# Patient Record
Sex: FEMALE | Race: ASIAN | NOT HISPANIC OR LATINO | Employment: UNEMPLOYED | URBAN - METROPOLITAN AREA
[De-identification: names, ages, dates, MRNs, and addresses within clinical notes are randomized per-mention and may not be internally consistent; named-entity substitution may affect disease eponyms.]

---

## 2017-09-29 ENCOUNTER — COMMUNICATION - HEALTHEAST (OUTPATIENT)
Dept: FAMILY MEDICINE | Facility: CLINIC | Age: 27
End: 2017-09-29

## 2017-10-05 ENCOUNTER — OFFICE VISIT - HEALTHEAST (OUTPATIENT)
Dept: FAMILY MEDICINE | Facility: CLINIC | Age: 27
End: 2017-10-05

## 2017-10-05 DIAGNOSIS — M25.531 RIGHT WRIST PAIN: ICD-10-CM

## 2017-10-05 DIAGNOSIS — Z23 NEED FOR IMMUNIZATION AGAINST INFLUENZA: ICD-10-CM

## 2017-10-05 ASSESSMENT — MIFFLIN-ST. JEOR: SCORE: 1221.18

## 2018-03-26 ENCOUNTER — COMMUNICATION - HEALTHEAST (OUTPATIENT)
Dept: FAMILY MEDICINE | Facility: CLINIC | Age: 28
End: 2018-03-26

## 2018-04-04 ENCOUNTER — COMMUNICATION - HEALTHEAST (OUTPATIENT)
Dept: FAMILY MEDICINE | Facility: CLINIC | Age: 28
End: 2018-04-04

## 2018-04-12 ENCOUNTER — OFFICE VISIT - HEALTHEAST (OUTPATIENT)
Dept: FAMILY MEDICINE | Facility: CLINIC | Age: 28
End: 2018-04-12

## 2018-04-12 DIAGNOSIS — Z00.00 ROUTINE GENERAL MEDICAL EXAMINATION AT A HEALTH CARE FACILITY: ICD-10-CM

## 2018-04-12 DIAGNOSIS — L91.8 SKIN TAG: ICD-10-CM

## 2018-04-12 LAB
CLUE CELLS: NORMAL
TRICHOMONAS, WET PREP: NORMAL
YEAST, WET PREP: NORMAL

## 2018-04-12 RX ORDER — COPPER 313.4 MG/1
1 INTRAUTERINE DEVICE INTRAUTERINE ONCE
Status: SHIPPED | COMMUNITY
Start: 2018-04-12 | End: 2024-02-02

## 2018-04-12 ASSESSMENT — MIFFLIN-ST. JEOR: SCORE: 1190.56

## 2018-04-13 LAB
C TRACH DNA SPEC QL PROBE+SIG AMP: NEGATIVE
N GONORRHOEA DNA SPEC QL NAA+PROBE: NEGATIVE

## 2018-04-16 LAB

## 2018-10-13 ENCOUNTER — AMBULATORY - HEALTHEAST (OUTPATIENT)
Dept: NURSING | Facility: CLINIC | Age: 28
End: 2018-10-13

## 2018-10-13 DIAGNOSIS — Z23 NEED FOR PROPHYLACTIC VACCINATION AND INOCULATION AGAINST INFLUENZA: ICD-10-CM

## 2019-04-04 ENCOUNTER — OFFICE VISIT - HEALTHEAST (OUTPATIENT)
Dept: FAMILY MEDICINE | Facility: CLINIC | Age: 29
End: 2019-04-04

## 2019-04-04 ENCOUNTER — COMMUNICATION - HEALTHEAST (OUTPATIENT)
Dept: FAMILY MEDICINE | Facility: CLINIC | Age: 29
End: 2019-04-04

## 2019-04-04 DIAGNOSIS — D69.6 THROMBOCYTOPENIA, UNSPECIFIED (H): ICD-10-CM

## 2019-04-04 DIAGNOSIS — G24.3 SPASMODIC TORTICOLLIS: ICD-10-CM

## 2019-04-04 DIAGNOSIS — S39.012A BACK STRAIN, INITIAL ENCOUNTER: ICD-10-CM

## 2019-04-04 DIAGNOSIS — D50.8 OTHER IRON DEFICIENCY ANEMIA: ICD-10-CM

## 2019-04-04 LAB
ERYTHROCYTE [DISTWIDTH] IN BLOOD BY AUTOMATED COUNT: 13 % (ref 11–14.5)
HCT VFR BLD AUTO: 34.5 % (ref 35–47)
HGB BLD-MCNC: 11 G/DL (ref 12–16)
MCH RBC QN AUTO: 24.7 PG (ref 27–34)
MCHC RBC AUTO-ENTMCNC: 32 G/DL (ref 32–36)
MCV RBC AUTO: 77 FL (ref 80–100)
PLATELET # BLD AUTO: 263 THOU/UL (ref 140–440)
PMV BLD AUTO: 8.3 FL (ref 7–10)
RBC # BLD AUTO: 4.47 MILL/UL (ref 3.8–5.4)
WBC: 5.6 THOU/UL (ref 4–11)

## 2019-04-04 ASSESSMENT — MIFFLIN-ST. JEOR: SCORE: 1202.47

## 2019-07-03 ENCOUNTER — OFFICE VISIT - HEALTHEAST (OUTPATIENT)
Dept: FAMILY MEDICINE | Facility: CLINIC | Age: 29
End: 2019-07-03

## 2019-07-03 DIAGNOSIS — D50.8 OTHER IRON DEFICIENCY ANEMIA: ICD-10-CM

## 2019-07-03 DIAGNOSIS — R71.8 MICROCYTOSIS: ICD-10-CM

## 2019-07-03 DIAGNOSIS — G24.3 SPASMODIC TORTICOLLIS: ICD-10-CM

## 2019-07-03 DIAGNOSIS — E66.01 CLASS 2 SEVERE OBESITY DUE TO EXCESS CALORIES WITH SERIOUS COMORBIDITY IN ADULT, UNSPECIFIED BMI (H): ICD-10-CM

## 2019-07-03 DIAGNOSIS — E66.812 CLASS 2 SEVERE OBESITY DUE TO EXCESS CALORIES WITH SERIOUS COMORBIDITY IN ADULT, UNSPECIFIED BMI (H): ICD-10-CM

## 2019-07-03 LAB
ERYTHROCYTE [DISTWIDTH] IN BLOOD BY AUTOMATED COUNT: 12.2 % (ref 11–14.5)
FERRITIN SERPL-MCNC: 7 NG/ML (ref 10–130)
HCT VFR BLD AUTO: 35 % (ref 35–47)
HGB BLD-MCNC: 11.5 G/DL (ref 12–16)
IRON SATN MFR SERPL: 6 % (ref 20–50)
IRON SERPL-MCNC: 24 UG/DL (ref 42–175)
MCH RBC QN AUTO: 25.6 PG (ref 27–34)
MCHC RBC AUTO-ENTMCNC: 32.8 G/DL (ref 32–36)
MCV RBC AUTO: 78 FL (ref 80–100)
PLATELET # BLD AUTO: 241 THOU/UL (ref 140–440)
PMV BLD AUTO: 8.6 FL (ref 7–10)
RBC # BLD AUTO: 4.48 MILL/UL (ref 3.8–5.4)
TIBC SERPL-MCNC: 402 UG/DL (ref 313–563)
TRANSFERRIN SERPL-MCNC: 322 MG/DL (ref 212–360)
WBC: 7.2 THOU/UL (ref 4–11)

## 2019-07-03 ASSESSMENT — MIFFLIN-ST. JEOR: SCORE: 1195.1

## 2019-07-05 ENCOUNTER — COMMUNICATION - HEALTHEAST (OUTPATIENT)
Dept: FAMILY MEDICINE | Facility: CLINIC | Age: 29
End: 2019-07-05

## 2019-07-09 ENCOUNTER — COMMUNICATION - HEALTHEAST (OUTPATIENT)
Dept: FAMILY MEDICINE | Facility: CLINIC | Age: 29
End: 2019-07-09

## 2021-05-27 NOTE — TELEPHONE ENCOUNTER
Int. ID: [id number]  Int. Name: Saw    Spoke to patient and relayed message. She understands the recommendation. No further questions. Schedule her for a 3 month follow up on Anemia.

## 2021-05-27 NOTE — TELEPHONE ENCOUNTER
----- Message from Papo Naik MD sent at 4/4/2019  3:44 PM CDT -----  Please call patient.  Tell patient:  Blood is about the same.  Likely from heavy menses.  Continue to maintain meat intake for the iron to supplement losses.  Check 3 months
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
## 2021-05-30 NOTE — TELEPHONE ENCOUNTER
Use  line to contact :Jesús ID:[id number]  Called and spoke with patient inform them of message. Per Patient she was told at the visit 7/3/2019 that you were going to send her some other back pain medication.

## 2021-05-30 NOTE — TELEPHONE ENCOUNTER
Received the same message as below. Will try again.     Use  line to contact : Negrita Herrera  ID:[id number]

## 2021-05-30 NOTE — TELEPHONE ENCOUNTER
Dr. Naik, this pt saw you on 7/3/19. Is the pt suppose to be getting prescription for below? If yes, please send Rx to [facility] pharmacy. Thanks.

## 2021-05-30 NOTE — TELEPHONE ENCOUNTER
Pt's spouse called questioning about the pt's medication. Pt have not returned call after 3 attempts. Completing task.

## 2021-05-30 NOTE — TELEPHONE ENCOUNTER
Attempted #1 Voice mail is not set up unable to leave message. Will try again later.   Use  line to contact : Aakash Paw  ID:[id number]

## 2021-05-30 NOTE — PROGRESS NOTES
Eating more meat  Hx small cell anemia assoc menses.    Energy good.    Past nl Hb    Obesity denies polyuria  Torticollis same     working  OBJECTIVE:   Vitals:    07/03/19 1614   BP: 102/50   Pulse: 100   Resp: 16      Eyes: non icteric, noninflamed  Lungs: no resp distress  Heart: regular  Ankles: no edema  Muscles: nontender  Mental status: euthymic  Neuro: torticollis  Left lashes without pigment    Body mass index is 31.96 kg/m .     ASSESSMENT/PLAN:  Mild microcytic anemia. ? thal or iron def or both.  Now on increased meat intake / labs and follow up per result  Chronic issues stable/ same treatment.   Obesity stable.  Has been asked to increase meat intake.  Hold on above for diet discussion    Additional diagnoses and related orders:  1. Spasmodic torticollis     2. Microcytosis  HM2(CBC w/o Differential)    Iron and Transferrin Iron Binding Capacity    Ferritin   3. Other iron deficiency anemia  Iron and Transferrin Iron Binding Capacity    Ferritin   4. Class 2 severe obesity due to excess calories with serious comorbidity in adult, unspecified BMI (H)

## 2021-05-30 NOTE — TELEPHONE ENCOUNTER
Question following Office Visit  When did you see your provider: 7/3/19  What is your question:  is calling via  for his wife stating she was told this medication would refilled :  methocarbamol (ROBAXIN) 750 MG tablet 40 tablet 0 4/4/2019 4/14/2019 --   Sig - Route: Take 1 tablet (750 mg total) by mouth 4 (four) times a day for 10 days. - Oral   Sent to pharmacy as: methocarbamol (ROBAXIN) 750 MG tablet   E-Prescribing Status: Receipt confirmed by pharmacy (4/4/2019  2:56 PM CDT)     Please send to [facility]  Okay to leave a detailed message: Yes    Patients  is not on the consent to communicate with. Advised  that patient needs to be present with return call to give verbal consent to speak with .
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
## 2021-05-30 NOTE — TELEPHONE ENCOUNTER
Used the language line, 's ID # is [id number]. Left message for the patient to call back. Ok to relay message upon returned call.

## 2021-05-30 NOTE — TELEPHONE ENCOUNTER
"Called and left message for pt to return call.# 1  Use  line to contact :[facility] ID:[id number]  \" Okay to relay message\"    "

## 2021-05-31 VITALS — HEIGHT: 56 IN | WEIGHT: 144 LBS | BODY MASS INDEX: 32.39 KG/M2

## 2021-06-01 VITALS — HEIGHT: 56 IN | BODY MASS INDEX: 31.27 KG/M2 | WEIGHT: 139 LBS

## 2021-06-02 VITALS — WEIGHT: 139 LBS | BODY MASS INDEX: 31.27 KG/M2 | HEIGHT: 56 IN

## 2021-06-03 VITALS — BODY MASS INDEX: 31.49 KG/M2 | HEIGHT: 56 IN | WEIGHT: 140 LBS

## 2021-06-09 ENCOUNTER — AMBULATORY - HEALTHEAST (OUTPATIENT)
Dept: NURSING | Facility: CLINIC | Age: 31
End: 2021-06-09

## 2021-06-13 NOTE — PROGRESS NOTES
Subjective:      Fady Blood is a 26 y.o. female who presents for evaluation of right wrist pain, status post wrist surgery 1 year ago.  She had ORIF of right wrist distal radius fracture about 1 year ago.  She kept all of her normal postop visits with the surgeon.  I reviewed last visit note from 12/1/2016, and things seemed to be proceeding as expected.  She started having pain in the right wrist about 1 or 2 weeks ago.  She describes the pain as feeling achy or pressure.  She has not tried any medicines for the pain yet.  She feels like cold weather makes the pain worse.  Pain usually stays at the wrist, but sometimes pain extends up towards the elbow.  No numbness or tingling.  No activity changes recently.  Normal  strength.  Patient is right-hand dominant.    Patient Active Problem List   Diagnosis     Spasmodic torticollis     Thrombocytopenia     Gynecologic Services Intrauterine Device (IUD) Checking     Vitamin D Deficiency     Closed fracture of distal end of right radius       Current Outpatient Prescriptions:      ibuprofen (ADVIL,MOTRIN) 400 MG tablet, Take 1 tablet (400 mg total) by mouth every 8 (eight) hours as needed for pain. Take with food., Disp: 40 tablet, Rfl: 0     Objective:     Allergies:  Review of patient's allergies indicates no known allergies.    Vitals:  Vitals:    10/05/17 1328   BP: 98/60   Pulse: 76   Resp: 16   Temp: 97.8  F (36.6  C)     Body mass index is 32.28 kg/(m^2).    Vital signs reviewed.  General: Patient is alert and oriented x 3, in no apparent distress  Cardiac: regular rate and rhythm, no murmurs  Pulmonary: lungs clear to auscultation bilaterally, no crackles, rales, rhonchi, or wheezing noted  Musculoskeletal: Mild pain with direct palpation over right distal radius where surgical scar is present, scar itself appears well-healed, no signs of infection or erythema, normal radial pulse, normal  strength, no pain at all with full active range of motion of  the right wrist    Assessment and Plan:   1.  Intermittent Right wrist pain, status post ORIF right distal radius fracture.  Surgery was completed about 1 year ago.  Exam is grossly normal today.  Prescription sent for ibuprofen to be used as needed.  I would like her to try this first for a few weeks.  If this is not helpful, she will let us know, then follow-up with Ortho.    This dictation uses voice recognition software, which may contain typographical errors.

## 2021-06-16 PROBLEM — R71.8 MICROCYTOSIS: Status: ACTIVE | Noted: 2019-07-03

## 2021-06-16 PROBLEM — D64.9 ANEMIA: Status: ACTIVE | Noted: 2019-07-03

## 2021-06-17 NOTE — PROGRESS NOTES
Assessment:   1. Routine general medical examination at a health care facility  - Gynecologic Cytology (PAP Smear)  - Chlamydia trachomatis & Neisseria gonorrhoeae, Amplified Detection  - Wet Prep, Vaginal  - lidocaine 10 mg/mL (1 %) injection 3 mL; 3 mL by Other route once.  - lidocaine-EPINEPHrine (PF) 2 %-1:200,000 injection 3 mL (XYLOCAINE W/EPI); 3 mL by Other route once.  - lidocaine-EPINEPHrine 2 %-1:100,000 injection 3 mL (XYLOCAINE W/EPI); 3 mL by Other route once  2. Skin tag  Plan:      All questions answered.  Await pap smear results.  STD screening negative.   Discussed results of wet prep. Will monitor her symptoms for any change or worsening and follow up.   Breast self exam technique reviewed and patient encouraged to perform self-exam monthly.  Discussed healthy lifestyle modifications.   Discussed options of management of skin tags. Patient would like them removed today. See procedure details below. No complications. Skin care discussed. Follow up as needed for this.   Subjective:      Fady Blood is a 27 y.o. female who presents for an annual exam. The patient is sexually active. The patient participates in regular exercise: no. The patient reports that there is not domestic violence in her life.   Is well today. Would like to have her IUD strings checked. She agrees to STD screening. She denies abnormal vaginal bleeding. She has some discharge some times, intermittent, over the last year. She wants to check for other infections.   She has skin tags on her left neck. She would like them to be removed. There are some bigger ones, some are smaller. She only wants the bigger skin tags removed because they are irritated by clothing, her necklace.     Healthy Habits:   Regular Exercise: No  Sunscreen Use: No  Healthy Diet: Yes  Dental Visits Regularly: Yes  Seat Belt: Yes  Sexually active: Yes  Self Breast Exam Monthly:Yes  Hemoccults: N/A  Flex Sig: N/A  Colonoscopy: N/A  Lipid Profile: No  Glucose  Screen: No  Prevention of Osteoporosis: No  Last Dexa: No  Guns at Home:  No      Immunization History   Administered Date(s) Administered     HPV Quadrivalent 2012, 2013     Hep A, historic 1900     Hep B, historic 12/15/2010, 2011, 2012     Influenza, Seasonal, Inj PF IIV3 2013     Influenza, seasonal,quad inj 36+ mos 2015, 10/05/2017     Influenza, seasonal,quad inj 6-35 mos 2011, 2012, 10/21/2014     MMR 12/15/2010     Td,adult,historic,unspecified 12/15/2010, 2012     Tdap 2011, 2013     Varicella Zoster Immune Globulin 2013     Immunization status: up to date and documented.    No exam data present    Gynecologic History  Patient's last menstrual period was 2018 (lmp unknown).  Contraception: IUD  Last Pap: . Results were: normal  Last mammogram: n/a      OB History    Para Term  AB Living   3 3 3   6   SAB TAB Ectopic Multiple Live Births       3      # Outcome Date GA Lbr Carlton/2nd Weight Sex Delivery Anes PTL Lv   3 Term     M Vag-Spont   SHANTHI   2 Term     F Vag-Spont   SHANTHI   1 Term     M Vag-Spont   SHANTHI      Obstetric Comments   1st child born in [facility], 2nd/3rd born in [place]       Current Outpatient Prescriptions   Medication Sig Dispense Refill     copper (PARAGARD) 380 square mm IUD IUD 1 each by Intrauterine route once. Inserted: 1/3/2014, EXP:2024       Current Facility-Administered Medications   Medication Dose Route Frequency Provider Last Rate Last Dose     lidocaine 10 mg/mL (1 %) injection 3 mL  3 mL Other Once Linda Fu MD         lidocaine-EPINEPHrine (PF) 2 %-1:200,000 injection 3 mL (XYLOCAINE W/EPI)  3 mL Other Once Linda Fu MD         No past medical history on file.  Past Surgical History:   Procedure Laterality Date     NO PAST SURGERIES       Review of patient's allergies indicates no known allergies.  Family History   Problem Relation Age of Onset     No Medical  "Problems Mother      No Medical Problems Father      Social History     Social History     Marital status:      Spouse name: N/A     Number of children: N/A     Years of education: N/A     Occupational History     not currently employed      Social History Main Topics     Smoking status: Never Smoker     Smokeless tobacco: Never Used     Alcohol use No     Drug use: No     Sexual activity: Yes     Partners: Male     Birth control/ protection: IUD     Other Topics Concern     Not on file     Social History Narrative    Patient is Suad speaking.    Lives with , 3 children    Born in [place], was in [place] ([place] to  [place]       Review of Systems  General:  Denies problem  Eyes: Denies problem  Ears/Nose/Throat: Denies problem  Cardiovascular: Denies problem  Respiratory:  Denies problem  Gastrointestinal:  Denies problem  Genitourinary: Denies problem  Musculoskeletal:  Denies problem  Skin: Denies problem  Neurologic: Denies problem  Psychiatric: Denies problem  Endocrine: Denies problem  Heme/Lymphatic: Denies problem   Allergic/Immunologic: Denies problem        Objective:         Vitals:    04/12/18 0800   BP: 94/70   Pulse: 78   Resp: 20   Temp: 98.2  F (36.8  C)   TempSrc: Oral   Weight: 139 lb (63 kg)   Height: 4' 7.5\" (1.41 m)     Body mass index is 31.73 kg/(m^2).    Physical Exam:  General Appearance: Alert, cooperative, no distress, appears stated age  Head: Normocephalic, without obvious abnormality, atraumatic  Eyes: PERRL, conjunctiva/corneas clear, EOM's intact  Ears: Normal TM's and external ear canals, both ears  Nose: Nares normal, septum midline,mucosa normal, no drainage  Throat: Lips, mucosa, and tongue normal; teeth and gums normal  Neck: Supple, symmetrical, trachea midline, no adenopathy;  thyroid: not enlarged, symmetric, no tenderness/mass/nodules; no carotid bruit or JVD  Back: Symmetric, no curvature, ROM normal, no CVA tenderness  Lungs: Clear to " auscultation bilaterally, respirations unlabored  Breasts: No breast masses, tenderness, asymmetry, or nipple discharge.  Heart: Regular rate and rhythm, S1 and S2 normal, no murmur, rub, or gallop  Abdomen: Soft, non-tender, bowel sounds active all four quadrants,  no masses, no organomegaly  Pelvic:Normally developed genitalia with no external lesions or eruptions. Vagina and cervix show no lesions, inflammation, discharge or tenderness. No cystocele, No rectocele. Uterus normal.  No adnexal mass or tenderness. IUD in place.   Extremities: Extremities normal, atraumatic, no cyanosis or edema  Skin: left neck with five pinpoint skin tags, 2 raised skin tags, 1-2 mm left neck, 1 raised skin tag left axilla  Lymph nodes: Cervical, supraclavicular, and axillary nodes normal  Neurologic: Normal      Recent Results (from the past 240 hour(s))   Gynecologic Cytology (PAP Smear)   Result Value Ref Range    Case Report       Gynecologic Cytology Report                       Case: [id number]                                   Authorizing Provider:  Linda Fu MD           Collected:           04/12/2018 0842              Ordering Location:     [facility]  Received:            04/12/2018 0843                                     Medicine/OB                                                                  First Screen:          VALE Robbins                                                                            (ASCP)                                                                       Rescreen:              VALE Drake                                                                             (ASCP)                                                                       Specimen:    SUREPATH PAP, SCREENING, Endocervical/cervical                                             Interpretation       Negative for squamous intraepithelial lesion or malignancy    Result Flag Normal Normal     Other Findings       Bacteria morphologically consistent with Actinomyces spp    Specimen Adequacy       Satisfactory for evaluation, endocervical/transformation zone component present    HPV Reflex? Yes if Abnormal     HIGH RISK No     LMP/Menopause Date 3/31/18     Abnormal Bleeding No     Pt Status n/a     Birth Control/Hormones IUD-No hormone     Previous Normal/Date 2013     Prev Abn Date/Dx none     Cervical Appearance normal    Chlamydia trachomatis & Neisseria gonorrhoeae, Amplified Detection   Result Value Ref Range    Chlamydia trachomatis, Amplified Detection Negative Negative    Neisseria gonorrhoeae, Amplified Detection Negative Negative   Wet Prep, Vaginal   Result Value Ref Range    Yeast Result No yeast seen No yeast seen    Trichomonas No Trichomonas seen No Trichomonas seen    Clue Cells, Wet Prep No Clue cells seen No Clue cells seen     Procedure:     Diagnosis: Chronically irritated skin tag   Instructions:    1. The patient is instructed to watch for signs of infection including erythema, pain,       purulent discharge, or crusting.   2. Verbal patient instruction given.  3. Follow up as needed.    The patient wishes skin tag removed as the lesion is getting caught on clothing and/or jewelry and is recurrently irritated.      Skin:  3 skin tags in the neck, left region, left axilla, were removed using scissors and forceps after betadine, alcohol prep; hemostasis is obtained with pressure and discarded.      No complications.   EBL: minimal

## 2021-06-19 NOTE — LETTER
Letter by Papo Naik MD at      Author: Papo Naik MD Service: -- Author Type: --    Filed:  Encounter Date: 7/3/2019 Status: (Other)         July 3, 2019     Patient: Fady Blood   YOB: 1990   Date of Visit: 7/3/2019       To Whom It May Concern:    It is my medical opinion that Fady Blood may return to work on July 8.    If you have any questions or concerns, please don't hesitate to call.    Sincerely,        Electronically signed by Papo Naik MD

## 2021-06-19 NOTE — LETTER
Letter by Papo Naik MD at      Author: Papo Naik MD Service: -- Author Type: --    Filed:  Encounter Date: 7/5/2019 Status: (Other)         Fady Duffy Jeremi  [address]  [place] [address]             July 5, 2019        Dear Ms. Blood,    Below are the results from your recent visit:    Resulted Orders   HM2(CBC w/o Differential)   Result Value Ref Range    WBC 7.2 4.0 - 11.0 thou/uL    RBC 4.48 3.80 - 5.40 mill/uL    Hemoglobin 11.5 (L) 12.0 - 16.0 g/dL    Hematocrit 35.0 35.0 - 47.0 %    MCV 78 (L) 80 - 100 fL    MCH 25.6 (L) 27.0 - 34.0 pg    MCHC 32.8 32.0 - 36.0 g/dL    RDW 12.2 11.0 - 14.5 %    Platelets 241 140 - 440 thou/uL    MPV 8.6 7.0 - 10.0 fL   Iron and Transferrin Iron Binding Capacity   Result Value Ref Range    Iron 24 (L) 42 - 175 ug/dL    Transferrin 322 212 - 360 mg/dL    Transferrin Saturation, Calculated 6 (L) 20 - 50 %    Transferrin IBC, Calculated 402 313 - 563 ug/dL   Ferritin   Result Value Ref Range    Ferritin 7 (L) 10 - 130 ng/mL       The blood is a littler better but the iron is still low.  Try to eat more meat and check in four months.      Please call with questions or contact us using [employer].    Sincerely,        Electronically signed by Papo Naik MD

## 2021-06-30 ENCOUNTER — AMBULATORY - HEALTHEAST (OUTPATIENT)
Dept: NURSING | Facility: CLINIC | Age: 31
End: 2021-06-30

## 2022-08-29 ENCOUNTER — HOSPITAL ENCOUNTER (EMERGENCY)
Facility: HOSPITAL | Age: 32
Discharge: HOME | End: 2022-08-29
Attending: EMERGENCY MEDICINE | Admitting: EMERGENCY MEDICINE
Payer: COMMERCIAL

## 2022-08-29 ENCOUNTER — APPOINTMENT (OUTPATIENT)
Dept: RADIOLOGY | Facility: HOSPITAL | Age: 32
End: 2022-08-29
Attending: EMERGENCY MEDICINE
Payer: COMMERCIAL

## 2022-08-29 VITALS
TEMPERATURE: 98.6 F | SYSTOLIC BLOOD PRESSURE: 145 MMHG | HEART RATE: 84 BPM | DIASTOLIC BLOOD PRESSURE: 88 MMHG | OXYGEN SATURATION: 98 % | RESPIRATION RATE: 20 BRPM

## 2022-08-29 DIAGNOSIS — S83.91XA SPRAIN OF RIGHT KNEE, UNSPECIFIED LIGAMENT, INITIAL ENCOUNTER: ICD-10-CM

## 2022-08-29 PROCEDURE — 99283 EMERGENCY DEPT VISIT LOW MDM: CPT

## 2022-08-29 PROCEDURE — 73560 X-RAY EXAM OF KNEE 1 OR 2: CPT | Mod: RT

## 2022-08-29 RX ORDER — IBUPROFEN 600 MG/1
600 TABLET, FILM COATED ORAL EVERY 8 HOURS PRN
Qty: 30 TABLET | Refills: 0 | Status: SHIPPED | OUTPATIENT
Start: 2022-08-29 | End: 2023-03-24

## 2022-08-29 NOTE — ED NOTES
ED Provider In Triage Note  [facility]  Encounter Date: Aug 29, 2022    Chief Complaint   Patient presents with     Knee Pain       Brief HPI:   Fady Blood is a 31 year old female presenting to the Emergency Department with a chief complaint of twisted right knee on Friday getting out of car. Delayed swelling. Medial knee pain.     Brief Physical Exam:  BP (!) 145/88   Pulse 84   Temp 98.6  F (37  C) (Temporal)   Resp 20   SpO2 98%   General: Non-toxic appearing  HEENT: Atraumatic  Resp: No respiratory distress  Abdomen: Non-peritoneal  Neuro: Alert, oriented, answers questions appropriately  Right knee: crepitus. Full extension. No visible deformity. No tibial plateau tenderness. limp  Psych: Behavior appropriate      Plan Initiated in Triage:  Orders Placed This Encounter     XR Knee Right 1/2 Views       PIT Dispo:   Return to lobby while awaiting workup and ED bed availability    Dipesh Scott MD on 8/29/2022 at 11:09 AM    Patient was evaluated by the Physician in Triage due to a limitation of available rooms in the Emergency Department. A plan of care was discussed based on the information obtained on the initial evaluation and patient was consuled to return back to the Emergency Department lobby after this initial evalutaiton until results were obtained or a room became available in the Emergency Department. Patient was counseled not to leave prior to receiving the results of their workup.     Dipesh Scott MD  [facility] EMERGENCY DEPARTMENT  [address] [place] [id number]  [phone]     Dipesh Scott MD  08/29/22 2887

## 2022-08-29 NOTE — ED PROVIDER NOTES
EMERGENCY DEPARTMENT ENCOUNTER      NAME: Fady Blood  AGE: 31 year old female  YOB: 1990  MRN: [id number]  EVALUATION DATE & TIME: No admission date for patient encounter.    PCP: Linda Fu    ED PROVIDER: Eyal Ching M.D.      Chief Complaint   Patient presents with     Knee Pain         FINAL IMPRESSION:  Right knee sprain      ED COURSE & MEDICAL DECISION MAKING:    Pertinent Labs & Imaging studies reviewed. (See chart for details)  31 year old female presents to the Emergency Department for evaluation of right knee pain.  Patient reports an achiness since twisting it while leaving work on Friday.  Patient has been ambulatory but continues to cause problems.  X-rays obtained in triage are unremarkable.  No fractures no effusions.  Examination reveals good range of motion.  No warmth or crepitus.  Gait normal.. Patient appears non toxic with stable vitals signs. Overall exam is benign.        12:02 PM I met with the patient for the initial interview and physical examination. Discussed plan for treatment and workup in the ED.      At the conclusion of the encounter I discussed the results of all of the tests and the disposition. The questions were answered and return precautions provided. The patient or family acknowledged understanding and was agreeable with the care plan.       PPE: Provider wore gloves, eye protection, surgical cap, and paper mask.     MEDICATIONS GIVEN IN THE EMERGENCY:  Medications - No data to display    NEW PRESCRIPTIONS STARTED AT TODAY'S ER VISIT  Current Discharge Medication List      START taking these medications    Details   ibuprofen (ADVIL/MOTRIN) 600 MG tablet Take 1 tablet (600 mg) by mouth every 8 hours as needed for moderate pain  Qty: 30 tablet, Refills: 0                =================================================================    HPI    Patient information was obtained from: Patient     Use of Intrepreter: Yes (by daughter in person) Language: Suad          Fady Blood is a 31 year old female with no pertient medical history who presents to the ED for evaluation of knee pain.    Patient reports on Friday (3 days ago), she was leaving work. She stepped outside and felt like she twisted her right knee. She reports her knee later became swollen at home. Patient denies any other current complaints.    REVIEW OF SYSTEMS   Constitutional:  Denies fever, chills  Respiratory:  Denies productive cough or increased work of breathing  Cardiovascular:  Denies chest pain, palpitations  GI:  Denies abdominal pain, nausea, vomiting, or change in bowel or bladder habits   Musculoskeletal:  Positive for right knee pain and swelling.  Skin:  Denies rash   Neurologic:  Denies focal weakness  All systems negative except as marked.     PAST MEDICAL HISTORY:  No past medical history on file.    PAST SURGICAL HISTORY:  Past Surgical History:   Procedure Laterality Date     NO PAST SURGERIES           CURRENT MEDICATIONS:    No current facility-administered medications for this encounter.    Current Outpatient Medications:      copper (PARAGARD) 380 square mm IUD IUD, [COPPER (PARAGARD) 380 SQUARE MM IUD IUD] 1 each by Intrauterine route once. Inserted: 1/3/2014, EXP:01/2024, Disp: , Rfl:     ALLERGIES:  No Known Allergies    FAMILY HISTORY:  Family History   Problem Relation Age of Onset     No Known Problems Mother      No Known Problems Father        SOCIAL HISTORY:   Social History     Socioeconomic History     Marital status:    Tobacco Use     Smoking status: Never Smoker     Smokeless tobacco: Never Used   Substance and Sexual Activity     Alcohol use: No     Drug use: No     Sexual activity: Yes     Partners: Male     Birth control/protection: I.U.D.   Social History Narrative    Patient is Suad speaking.  Lives with , 3 children  Born in [facility], was in refugee camps x 10 years ([facility]); to  2011       VITALS:  Patient Vitals for the past 24 hrs:   BP Temp  Temp src Pulse Resp SpO2   08/29/22 1104 (!) 145/88 98.6  F (37  C) Temporal 84 20 98 %        PHYSICAL EXAM    Constitutional:  Awake, alert, in no apparent distress  HENT:  Normocephalic, Atraumatic. Bilateral external ears normal. Oropharynx moist. Nose normal. Neck- Normal range of motion   Eyes:  PERRL, EOMI with no signs of entrapment, Conjunctiva normal, No discharge.   Musculoskeletal:  I No edema. Good range of motion in all major joints. Minimal tenderness along medial joint line. No warmth or effusions. No laxity.  Integument:  Warm, Dry, No erythema, No rash.   Neurologic:  Alert & oriented, Normal motor function, Normal sensory function, No focal deficits noted.   Psychiatric:  Affect normal, Judgment normal, Mood normal.         LAB:  All pertinent labs reviewed and interpreted.      RADIOLOGY:  Reviewed all pertinent imaging. Please see official radiology report.  XR Knee Right 1/2 Views   Final Result   IMPRESSION: Normal joint spaces and alignment. No fracture or joint effusion.                                       I, Harmony Liu, am serving as a scribe to document services personally performed by Eyal Ching MD, based on my observation and the provider's statements to me. I, Eyal hCing MD attest that Harmony Liu is acting in a scribe capacity, has observed my performance of the services and has documented them in accordance with my direction.    Eyal Ching M.D.  Emergency Medicine  [facility] EMERGENCY DEPARTMENT     Eyal Ching MD  08/29/22 [id number]

## 2022-08-29 NOTE — Clinical Note
Fady Blood was seen and treated in our emergency department on 8/29/2022.  She may return to work on 08/30/2022.       If you have any questions or concerns, please don't hesitate to call.      Eyal Ching MD

## 2023-03-24 ENCOUNTER — OFFICE VISIT (OUTPATIENT)
Dept: FAMILY MEDICINE | Facility: CLINIC | Age: 33
End: 2023-03-24
Payer: COMMERCIAL

## 2023-03-24 VITALS
BODY MASS INDEX: 33.29 KG/M2 | DIASTOLIC BLOOD PRESSURE: 68 MMHG | TEMPERATURE: 97.7 F | SYSTOLIC BLOOD PRESSURE: 97 MMHG | HEART RATE: 87 BPM | WEIGHT: 148 LBS | HEIGHT: 56 IN | RESPIRATION RATE: 18 BRPM | OXYGEN SATURATION: 98 %

## 2023-03-24 DIAGNOSIS — Z23 NEEDS FLU SHOT: ICD-10-CM

## 2023-03-24 DIAGNOSIS — Z23 NEED FOR COVID-19 VACCINE: ICD-10-CM

## 2023-03-24 DIAGNOSIS — R05.1 ACUTE COUGH: ICD-10-CM

## 2023-03-24 DIAGNOSIS — N89.8 VAGINAL DISCHARGE: ICD-10-CM

## 2023-03-24 DIAGNOSIS — N64.4 MASTALGIA: Primary | ICD-10-CM

## 2023-03-24 DIAGNOSIS — Z12.4 CERVICAL CANCER SCREENING: ICD-10-CM

## 2023-03-24 LAB
CLUE CELLS: NORMAL
FLUAV AG SPEC QL IA: NEGATIVE
FLUBV AG SPEC QL IA: NEGATIVE
TRICHOMONAS, WET PREP: NORMAL
WBC'S/HIGH POWER FIELD, WET PREP: NORMAL
YEAST, WET PREP: NORMAL

## 2023-03-24 PROCEDURE — 90471 IMMUNIZATION ADMIN: CPT | Performed by: STUDENT IN AN ORGANIZED HEALTH CARE EDUCATION/TRAINING PROGRAM

## 2023-03-24 PROCEDURE — 99203 OFFICE O/P NEW LOW 30 MIN: CPT | Mod: CS | Performed by: STUDENT IN AN ORGANIZED HEALTH CARE EDUCATION/TRAINING PROGRAM

## 2023-03-24 PROCEDURE — U0003 INFECTIOUS AGENT DETECTION BY NUCLEIC ACID (DNA OR RNA); SEVERE ACUTE RESPIRATORY SYNDROME CORONAVIRUS 2 (SARS-COV-2) (CORONAVIRUS DISEASE [COVID-19]), AMPLIFIED PROBE TECHNIQUE, MAKING USE OF HIGH THROUGHPUT TECHNOLOGIES AS DESCRIBED BY CMS-2020-01-R: HCPCS | Performed by: STUDENT IN AN ORGANIZED HEALTH CARE EDUCATION/TRAINING PROGRAM

## 2023-03-24 PROCEDURE — 90686 IIV4 VACC NO PRSV 0.5 ML IM: CPT | Performed by: STUDENT IN AN ORGANIZED HEALTH CARE EDUCATION/TRAINING PROGRAM

## 2023-03-24 PROCEDURE — 87491 CHLMYD TRACH DNA AMP PROBE: CPT | Performed by: STUDENT IN AN ORGANIZED HEALTH CARE EDUCATION/TRAINING PROGRAM

## 2023-03-24 PROCEDURE — 87591 N.GONORRHOEAE DNA AMP PROB: CPT | Performed by: STUDENT IN AN ORGANIZED HEALTH CARE EDUCATION/TRAINING PROGRAM

## 2023-03-24 PROCEDURE — 91312 COVID-19 VACCINE BIVALENT BOOSTER 12+ (PFIZER): CPT | Performed by: STUDENT IN AN ORGANIZED HEALTH CARE EDUCATION/TRAINING PROGRAM

## 2023-03-24 PROCEDURE — 87210 SMEAR WET MOUNT SALINE/INK: CPT | Performed by: STUDENT IN AN ORGANIZED HEALTH CARE EDUCATION/TRAINING PROGRAM

## 2023-03-24 PROCEDURE — 0124A COVID-19 VACCINE BIVALENT BOOSTER 12+ (PFIZER): CPT | Performed by: STUDENT IN AN ORGANIZED HEALTH CARE EDUCATION/TRAINING PROGRAM

## 2023-03-24 PROCEDURE — 87804 INFLUENZA ASSAY W/OPTIC: CPT | Performed by: STUDENT IN AN ORGANIZED HEALTH CARE EDUCATION/TRAINING PROGRAM

## 2023-03-24 PROCEDURE — U0005 INFEC AGEN DETEC AMPLI PROBE: HCPCS | Performed by: STUDENT IN AN ORGANIZED HEALTH CARE EDUCATION/TRAINING PROGRAM

## 2023-03-24 RX ORDER — IBUPROFEN 600 MG/1
600 TABLET, FILM COATED ORAL EVERY 8 HOURS PRN
Qty: 30 TABLET | Refills: 1 | Status: SHIPPED | OUTPATIENT
Start: 2023-03-24 | End: 2023-04-21

## 2023-03-24 NOTE — LETTER
March 31, 2023      Fady CRAVEN Jeremi  [address]   [place] [address]        Dear ,    We are writing to inform you of your test results.    Your test results are negative   Resulted Orders   Symptomatic COVID-19 Virus (Coronavirus) by PCR Nose   Result Value Ref Range    SARS CoV2 PCR Negative Negative      Comment:      NEGATIVE: SARS-CoV-2 (COVID-19) RNA not detected, presumed negative.    Narrative    Testing was performed using the [employer] SARS-CoV-2 Assay on the  [employer] Instrument System. Additional information about this  Emergency Use Authorization (EUA) assay can be found via the Lab  Guide. This test should be ordered for the detection of SARS-CoV-2 in  individuals who meet SARS-CoV-2 clinical and/or epidemiological  criteria. Test performance is unknown in asymptomatic patients. This  test is for in vitro diagnostic use under the FDA EUA for  laboratories certified under CLIA to perform high complexity testing.  This test has not been FDA cleared or approved. A negative result  does not rule out the presence of PCR inhibitors in the specimen or  target RNA in concentration below the limit of detection for the  assay. The possibility of a false negative should be considered if  the patient's recent exposure or clinical presentation suggests  COVID-19. This test was validated by the [facility]. This laboratory is certified under  the Clinical Laboratory Improvement Amendments of 1988 (CLIA-88) as  qualified to perform high complexity laboratory testing.   Influenza A/B antigen   Result Value Ref Range    Influenza A antigen Negative Negative    Influenza B antigen Negative Negative    Narrative    Test results must be correlated with clinical data. If necessary, results should be confirmed by a molecular assay or viral culture.   Wet prep - Clinic Collect   Result Value Ref Range    Trichomonas Absent Absent    Yeast Absent Absent    Clue  Cells Absent Absent    WBCs/high power field None None   Chlamydia & Gonorrhea by PCR, [facility] - Clinic Collect   Result Value Ref Range    Chlamydia Trachomatis Negative Negative      Comment:      Negative for C. trachomatis rRNA by transcription mediated amplification.   A negative result by transcription mediated amplification does not preclude the presence of infection because results are dependent on proper and adequate collection, absence of inhibitors and sufficient rRNA to be detected.    Neisseria gonorrhoeae Negative Negative      Comment:      Negative for N. gonorrhoeae rRNA by transcription mediated amplification. A negative result by transcription mediated amplification does not preclude the presence of C. trachomatis infection because results are dependent on proper and adequate collection, absence of inhibitors and sufficient rRNA to be detected.       If you have any questions or concerns, please call the clinic at the number listed above.       Sincerely,      Jennie Monroy MD

## 2023-03-24 NOTE — PROGRESS NOTES
"  Assessment & Plan     Mastalgia  Reports that this has been chronic for years.  Pain seems to come on 3 weeks out of the month, however was not able to clarify the exact timeline, patient did report that she did not think it was cyclical or related to her period.  No changes in appearance, no lymphadenopathy, no discharge no palpable masses.  Will order mammogram, ibuprofen as needed for pain.  - MA Diagnostic Digital Bilateral  - ibuprofen (ADVIL/MOTRIN) 600 MG tablet  Dispense: 30 tablet; Refill: 1    Acute cough  Likely viral URI.  Lungs are clear to auscultation, vital stable, no respiratory distress.  Will check for COVID and flu.  - Symptomatic COVID-19 Virus (Coronavirus) by PCR Nose  - Influenza A/B antigen    Vaginal discharge  Patient reports chunky white discharge with bloody color.  Declined Pap as she is on her period today.  Patient reported that she would like to self swab.  - Wet prep - Clinic Collect  - Chlamydia & Gonorrhea by PCR, [facility] - Clinic Collect    Needs flu shot  - INFLUENZA VACCINE IM > 6 MONTHS VALENT IIV4 (AFLURIA/FLUZONE)    Need for COVID-19 vaccine  - COVID-19,PF,PFIZER BOOSTER BIVALENT (12+YRS)    Cervical cancer screening  Patient would like to defer secondary to be on her period.       Review of external notes as documented elsewhere in note  }     BMI:   Estimated body mass index is 33.06 kg/m  as calculated from the following:    Height as of this encounter: 1.425 m (4' 8.1\").    Weight as of this encounter: 67.1 kg (148 lb).       Jennie Monroy MD  [facility]   Ma is a 32 year old, presenting for the following health issues:  office visit (Pain in breasts for over 10 years, somehow the pain it getting worse. Vaginal discharge for over a month. Coughing, abd pain.)    Additional Questions 3/24/2023   Roomed by    Accompanied by self     History of Present Illness       Reason for visit:  Pain in breasts    She eats 2-3 servings of " "fruits and vegetables daily.She consumes 0 sweetened beverage(s) daily.She exercises with enough effort to increase her heart rate 9 or less minutes per day.  She exercises with enough effort to increase her heart rate 3 or less days per week.   She is taking medications regularly.       Bilateral breast pain  - Onset: long time, usually not bad, but worsening this year and severe.   - Location/radiation: both breasts, whole breasts  - Timing: some weeks, every day, but comes and goes.   - Pain is severe today  - LMP 2/27  - Does not seem to correlate with periods or cyclical   - If pain is severe, will have fevers.   - Denies discharge, changes in appearance or lumps.   - has not tried meds.   - Has copper IUD, no other meds.     Vaginal discharge  - Intermittent white discharge, with blood.   - smells strange.  - Sometimes clumps and feels like a lot of discharge.   - Has been chronic, no irritation.   - Just doesn't like how much discharge she has.     Cough   - constant, but can comes and goes as well  - When it comes on, feels like a long cough  - dry cough  - duration: happens once a years and will last for 1 month and a half. This episode has been about 1 week.   - Denies SOB, fever, h/o lung disease/asthma, h/o allergies, heartburn.       Review of Systems   Constitutional: Negative for fever and chills.   Respiratory: Negative for shortness of breath. Positive for cough.   Cardiovascular: Negative for chest pain or chest pressure.   Gastrointestinal: Negative for nausea, vomiting, diarrhea or abdominal pain.        Objective    BP 97/68 (BP Location: Left arm, Patient Position: Sitting, Cuff Size: Adult Regular)   Pulse 87   Temp 97.7  F (36.5  C) (Temporal)   Resp 18   Ht 1.425 m (4' 8.1\")   Wt 67.1 kg (148 lb)   LMP 02/25/2023   SpO2 98%   BMI 33.06 kg/m    Body mass index is 33.06 kg/m .  Physical Exam   General Appearance:  Alert, cooperative, no distress, appears stated age.  Head:  " Normocephalic, without obvious abnormality, atraumatic.  Eyes:  Conjunctivae/corneas clear, extraocular movements intact both eyes.  Lungs:  Clear to auscultation bilaterally, respirations unlabored.  Heart:  Regular rate and rhythm, S1 and S2 normal, no murmur, rub or gallop.  Breast: Symmetrical, no nipple inversion, no skin changes, no lymphadenopathy, no tenderness to palpation.  No palpable masses bilaterally  Extremities:  Atraumatic, no cyanosis or edema.  Skin:  Skin color, texture, turgor normal, no rashes or lesions.  Neurologic: No focal deficits.

## 2023-03-24 NOTE — PROGRESS NOTES
Women presenting with a breast mass will require imaging and further assessment to exclude cancer. Diagnostic mammography is usually preferred, but ultrasonography is more sensitive in women younger than 30 years. Any suspicious mass detected on physical examination, mammography, or ultrasonography should undergo biopsy. In most cases, a core needle biopsy should be performed with imaging guidance for evaluation of a suspicious mass. Mastalgia is usually not an indication of underlying malignancy. Oral contraceptives, hormone therapy, some psychotropic drugs, and some cardiovascular agents have been associated with mastalgia. Focal breast pain should be evaluated with diagnostic imaging. Targeted ultrasonography localized to discrete areas of the breast can be used alone to evaluate focal breast pain in women younger than 30 years, and as an adjunct to mammography in women 30 years and older. Topical nonsteroidal anti-inflammatory drugs, such as diclofenac, are a first-line treatment option. The first step in the diagnostic evaluation of patients with nipple discharge is classification of the discharge as pathologic or physiologic. Nipple discharge is classified as pathologic if it is spontaneous, bloody, unilateral, or associated with a breast mass. Patients with pathologic discharge should undergo diagnostic imaging. Galactorrhea is the most common cause of physiologic discharge not associated with pregnancy or lactation. It occurs as a result of an endocrinopathy (hyperprolactinemia or thyroid dysfunction) or from the use of dopamine-inhibiting medications.    Answers for HPI/ROS submitted by the patient on 3/24/2023  What is the reason for your visit today? : pain in breasts  How many servings of fruits and vegetables do you eat daily?: 2-3  On average, how many sweetened beverages do you drink each day (Examples: soda, juice, sweet tea, etc.  Do NOT count diet or artificially sweetened beverages)?: 0  How many  minutes a day do you exercise enough to make your heart beat faster?: 9 or less  How many days a week do you exercise enough to make your heart beat faster?: 3 or less  How many days per week do you miss taking your medication?: 0

## 2023-03-25 LAB
C TRACH DNA SPEC QL PROBE+SIG AMP: NEGATIVE
N GONORRHOEA DNA SPEC QL NAA+PROBE: NEGATIVE
SARS-COV-2 RNA RESP QL NAA+PROBE: NEGATIVE

## 2023-03-31 ENCOUNTER — TELEPHONE (OUTPATIENT)
Dept: FAMILY MEDICINE | Facility: CLINIC | Age: 33
End: 2023-03-31
Payer: COMMERCIAL

## 2023-03-31 NOTE — TELEPHONE ENCOUNTER
----- Message from Jennie Monroy MD sent at 3/30/2023  4:34 PM CDT -----  Test for vaginal infections, flu test, COVID test, test for chlamydia and gonorrhea were all negative.

## 2023-04-21 ENCOUNTER — OFFICE VISIT (OUTPATIENT)
Dept: FAMILY MEDICINE | Facility: CLINIC | Age: 33
End: 2023-04-21
Payer: COMMERCIAL

## 2023-04-21 VITALS
HEART RATE: 74 BPM | RESPIRATION RATE: 16 BRPM | DIASTOLIC BLOOD PRESSURE: 77 MMHG | HEIGHT: 56 IN | TEMPERATURE: 97.9 F | WEIGHT: 149 LBS | OXYGEN SATURATION: 99 % | BODY MASS INDEX: 33.52 KG/M2 | SYSTOLIC BLOOD PRESSURE: 108 MMHG

## 2023-04-21 DIAGNOSIS — Z12.4 SCREENING FOR CERVICAL CANCER: ICD-10-CM

## 2023-04-21 DIAGNOSIS — Z00.00 ROUTINE GENERAL MEDICAL EXAMINATION AT A HEALTH CARE FACILITY: Primary | ICD-10-CM

## 2023-04-21 DIAGNOSIS — R05.9 COUGH, UNSPECIFIED TYPE: ICD-10-CM

## 2023-04-21 PROCEDURE — G0145 SCR C/V CYTO,THINLAYER,RESCR: HCPCS | Performed by: FAMILY MEDICINE

## 2023-04-21 PROCEDURE — 99395 PREV VISIT EST AGE 18-39: CPT | Performed by: FAMILY MEDICINE

## 2023-04-21 PROCEDURE — 87624 HPV HI-RISK TYP POOLED RSLT: CPT | Performed by: FAMILY MEDICINE

## 2023-04-21 RX ORDER — GUAIFENESIN 600 MG/1
1200 TABLET, EXTENDED RELEASE ORAL 2 TIMES DAILY
Qty: 20 TABLET | Refills: 0 | Status: SHIPPED | OUTPATIENT
Start: 2023-04-21

## 2023-04-21 RX ORDER — COPPER 313.4 MG/1
1 INTRAUTERINE DEVICE INTRAUTERINE
COMMUNITY
End: 2024-02-02

## 2023-04-21 ASSESSMENT — ENCOUNTER SYMPTOMS
MYALGIAS: 0
ARTHRALGIAS: 0
SHORTNESS OF BREATH: 0
NERVOUS/ANXIOUS: 0
HEARTBURN: 0
ABDOMINAL PAIN: 0
EYE PAIN: 0
SORE THROAT: 0
CHILLS: 0
DIARRHEA: 0
HEMATURIA: 0
NAUSEA: 0
CONSTIPATION: 0
FEVER: 0
COUGH: 1
PARESTHESIAS: 0
WEAKNESS: 0
HEADACHES: 0
BREAST MASS: 0
HEMATOCHEZIA: 0
JOINT SWELLING: 0
FREQUENCY: 0
DYSURIA: 0
DIZZINESS: 0
PALPITATIONS: 0

## 2023-04-21 NOTE — PROGRESS NOTES
SUBJECTIVE:   CC: Ma is an 32 year old who presents for preventive health visit.       2023     9:19 AM   Additional Questions   Roomed by Cassie   Accompanied by with son   Patient has been advised of split billing requirements and indicates understanding: Yes  HPI  Here for annual physical.   No concerns.   Has normal menstrual cycle. Has her IUD in place.   Has had a cough on and off, mild, just wants a cough medicine. No fever. No trouble breathing. No seasonal allergies.     Today's PHQ-2 Score:       2023     9:18 AM   PHQ-2 (  [employer])   Q1: Little interest or pleasure in doing things 0   Q2: Feeling down, depressed or hopeless 0   PHQ-2 Score 0   Q1: Little interest or pleasure in doing things Not at all   Q2: Feeling down, depressed or hopeless Not at all   PHQ-2 Score 0       Have you ever done Advance Care Planning? (For example, a Health Directive, POLST, or a discussion with a medical provider or your loved ones about your wishes): No, advance care planning information given to patient to review.  Patient declined advance care planning discussion at this time.    Social History     Tobacco Use     Smoking status: Never     Smokeless tobacco: Never   Vaping Use     Vaping status: Never Used   Substance Use Topics     Alcohol use: No             2023     9:18 AM   Alcohol Use   Prescreen: >3 drinks/day or >7 drinks/week? No     Reviewed orders with patient.  Reviewed health maintenance and updated orders accordingly - Yes  Lab work is in process    Breast Cancer Screenin/21/2023     9:18 AM   Breast CA Risk Assessment (FHS-7)   Do you have a family history of breast, colon, or ovarian cancer? No / Unknown       History of abnormal Pap smear: NO - age 30-65 PAP every 5 years with negative HPV co-testing recommended      2018     8:42 AM   PAP / HPV   PAP Negative for squamous intraepithelial lesion or malignancy  Electronically signed by Angela Melara CT (ASCP)  "on 2018 at  4:27 PM         Reviewed and updated as needed this visit by clinical staff   Tobacco  Allergies  Meds              Reviewed and updated as needed this visit by Provider                 No past medical history on file.   Past Surgical History:   Procedure Laterality Date     NO PAST SURGERIES       OB History    Para Term  AB Living   3 3 3 0 0 3   SAB IAB Ectopic Multiple Live Births   0 0 0 0 3      # Outcome Date GA Lbr [place]/2nd Weight Sex Delivery Anes PTL Lv   3 Term     M Vag-Spont   SHANTHI   2 Term     F Vag-Spont   SHANTHI   1 Term 2009    M Vag-Spont   SHANTHI     Family History   Problem Relation Age of Onset     No Known Problems Mother      No Known Problems Father           OBJECTIVE:   /77 (BP Location: Left arm, Patient Position: Sitting, Cuff Size: Adult Regular)   Pulse 74   Temp 97.9  F (36.6  C) (Temporal)   Resp 16   Ht 1.41 m (4' 7.51\")   Wt 67.6 kg (149 lb)   LMP 2023 (Exact Date)   SpO2 99%   BMI 34.00 kg/m    Physical Exam  GENERAL: healthy, alert and no distress  EYES: Eyes grossly normal to inspection, PERRL and conjunctivae and sclerae normal  HENT: ear canals and TM's normal, nose and mouth without ulcers or lesions  NECK: no adenopathy, no asymmetry, masses, or scars and thyroid normal to palpation  RESP: lungs clear to auscultation - no rales, rhonchi or wheezes  BREAST: normal without masses, tenderness or nipple discharge and no palpable axillary masses or adenopathy  CV: regular rate and rhythm, normal S1 S2, no S3 or S4, no murmur, click or rub, no peripheral edema and peripheral pulses strong  ABDOMEN: soft, nontender, no hepatosplenomegaly, no masses and bowel sounds normal   (female): normal female external genitalia, normal urethral meatus, vaginal mucosa, normal cervix/adnexa/uterus without masses or discharge  MS: no gross musculoskeletal defects noted, no edema  NEURO: Normal strength and tone, mentation intact and stable " torticollis  PSYCH: mentation appears normal, affect normal/bright    Diagnostic Test Results:  Labs reviewed in Epic  Results for orders placed or performed in visit on 04/21/23   PAP screen with HPV - recommended age 30 - 65 years     Status: None   Result Value Ref Range    Interpretation        Negative for Intraepithelial Lesion or Malignancy (NILM)    Other Findings  Endometrial cells in a woman >=45 years of age; endometrial cells correlate with menstrual history provided, Trichomonas vaginalis, Fungal organisms morphologically consistent with Candida spp, Shift in vaginal steve suggestive of bacterial vaginosis,...     Bacteria morphologically consistent with Actinomyces spp    Comment         Papanicolaou Test Limitations:  Cervical cytology is a screening test with limited sensitivity, and regular screening is critical for cancer prevention.  Pap tests are primarily effective for the diagnosis/prevention of squamous cell carcinoma, not adenocarcinoma or other cancers.        Specimen Adequacy       Satisfactory for evaluation, endocervical/transformation zone component present    Clinical Information       none      LMP/Menopause Date       3/28/2023      Reflex Testing Yes regardless of result     Previous Abnormal?       No      Performing Labs       The technical component of this testing was completed at [facility]         ASSESSMENT/PLAN:   (Z00.00) Routine general medical examination at a health care facility  (primary encounter diagnosis)  Plan: REVIEW OF HEALTH MAINTENANCE PROTOCOL ORDERS  (Z12.4) Screening for cervical cancer  Plan: PAP screen with HPV - recommended age 30 - 65         years, HPV Hold (Lab Only)  (R05.9) Cough, unspecified type  Plan: guaiFENesin (MUCINEX) 600 MG 12 hr tablet  EHR reviewed.   Past medical history, problem list, past surgical history, family history, social history, medications reviewed, updated,  "reconciled.   Pap smear collected. Defers STD screening.   Reviewed healthy lifestyle modifications to improve BMI.   Antitussive medication provided for use as needed, no signs of bacterial illness.   Encouraged to keep up with routine health maintenance.     Patient has been advised of split billing requirements and indicates understanding: Yes      COUNSELING:  Reviewed preventive health counseling, as reflected in patient instructions       Regular exercise       Healthy diet/nutrition       Vision screening       Contraception       Family planning       Osteoporosis prevention/bone health       Advance Care Planning      BMI:   Estimated body mass index is 34 kg/m  as calculated from the following:    Height as of this encounter: 1.41 m (4' 7.51\").    Weight as of this encounter: 67.6 kg (149 lb).   Weight management plan: Discussed healthy diet and exercise guidelines      She reports that she has never smoked. She has never used smokeless tobacco.          Linda Fu MD  [facility]  Prior to immunization administration, verified patients identity using patient s name and date of birth. Please see Immunization Activity for additional information.     Screening Questionnaire for Adult Immunization    Are you sick today?   No   Do you have allergies to medications, food, a vaccine component or latex?   No   Have you ever had a serious reaction after receiving a vaccination?   No   Do you have a long-term health problem with heart, lung, kidney, or metabolic disease (e.g., diabetes), asthma, a blood disorder, no spleen, complement component deficiency, a cochlear implant, or a spinal fluid leak?  Are you on long-term aspirin therapy?   No   Do you have cancer, leukemia, HIV/AIDS, or any other immune system problem?   No   Do you have a parent, brother, or sister with an immune system problem?   No   In the past 3 months, have you taken medications that affect  your immune system, " such as prednisone, other steroids, or anticancer drugs; drugs for the treatment of rheumatoid arthritis, Crohn s disease, or psoriasis; or have you had radiation treatments?   No   Have you had a seizure, or a brain or other nervous system problem?   No   During the past year, have you received a transfusion of blood or blood    products, or been given immune (gamma) globulin or antiviral drug?   No   For women: Are you pregnant or is there a chance you could become       pregnant during the next month?   No   Have you received any vaccinations in the past 4 weeks?   Yes     Immunization questionnaire was positive for at least one answer.  Notified  Pt was here on 3/24/23 to get Covid and flu vaccine..      Injection of  given by Cassie Pratt MA. Patient instructed to remain in clinic for 15 minutes afterwards, and to report any adverse reactions.     Screening performed by Cassie Pratt MA on 4/21/2023 at 9:24 AM.

## 2023-04-26 LAB
BKR LAB AP GYN ADEQUACY: NORMAL
BKR LAB AP GYN INTERPRETATION: NORMAL
BKR LAB AP GYN OTHER FINDINGS: NORMAL
BKR LAB AP HPV REFLEX: NORMAL
BKR LAB AP LMP: NORMAL
BKR LAB AP PREVIOUS ABNORMAL: NORMAL
PATH REPORT.COMMENTS IMP SPEC: NORMAL
PATH REPORT.COMMENTS IMP SPEC: NORMAL
PATH REPORT.RELEVANT HX SPEC: NORMAL

## 2023-04-28 LAB
HUMAN PAPILLOMA VIRUS 16 DNA: NEGATIVE
HUMAN PAPILLOMA VIRUS 18 DNA: NEGATIVE
HUMAN PAPILLOMA VIRUS FINAL DIAGNOSIS: NORMAL
HUMAN PAPILLOMA VIRUS OTHER HR: NEGATIVE

## 2024-02-02 ENCOUNTER — OFFICE VISIT (OUTPATIENT)
Dept: FAMILY MEDICINE | Facility: CLINIC | Age: 34
End: 2024-02-02
Payer: MEDICAID

## 2024-02-02 VITALS
WEIGHT: 146 LBS | HEIGHT: 57 IN | RESPIRATION RATE: 16 BRPM | DIASTOLIC BLOOD PRESSURE: 72 MMHG | OXYGEN SATURATION: 98 % | SYSTOLIC BLOOD PRESSURE: 105 MMHG | HEART RATE: 79 BPM | BODY MASS INDEX: 31.5 KG/M2 | TEMPERATURE: 97.3 F

## 2024-02-02 DIAGNOSIS — Z30.430 ENCOUNTER FOR IUD INSERTION: ICD-10-CM

## 2024-02-02 DIAGNOSIS — Z30.432 ENCOUNTER FOR IUD REMOVAL: ICD-10-CM

## 2024-02-02 PROCEDURE — 90472 IMMUNIZATION ADMIN EACH ADD: CPT | Performed by: FAMILY MEDICINE

## 2024-02-02 PROCEDURE — 91320 SARSCV2 VAC 30MCG TRS-SUC IM: CPT | Performed by: FAMILY MEDICINE

## 2024-02-02 PROCEDURE — 90651 9VHPV VACCINE 2/3 DOSE IM: CPT | Performed by: FAMILY MEDICINE

## 2024-02-02 PROCEDURE — 58301 REMOVE INTRAUTERINE DEVICE: CPT | Performed by: FAMILY MEDICINE

## 2024-02-02 PROCEDURE — 90480 ADMN SARSCOV2 VAC 1/ONLY CMP: CPT | Performed by: FAMILY MEDICINE

## 2024-02-02 PROCEDURE — 90715 TDAP VACCINE 7 YRS/> IM: CPT | Performed by: FAMILY MEDICINE

## 2024-02-02 PROCEDURE — 90471 IMMUNIZATION ADMIN: CPT | Performed by: FAMILY MEDICINE

## 2024-02-02 PROCEDURE — 58300 INSERT INTRAUTERINE DEVICE: CPT | Mod: 59 | Performed by: FAMILY MEDICINE

## 2024-02-02 RX ORDER — COPPER 313.4 MG/1
1 INTRAUTERINE DEVICE INTRAUTERINE ONCE
Start: 2024-02-02 | End: 2024-02-02

## 2024-02-02 RX ORDER — COPPER 313.4 MG/1
1 INTRAUTERINE DEVICE INTRAUTERINE ONCE
Status: COMPLETED
Start: 2024-02-02 | End: 2024-02-02

## 2024-02-02 RX ADMIN — COPPER 1 EACH: 313.4 INTRAUTERINE DEVICE INTRAUTERINE at 09:31

## 2024-02-02 NOTE — PROGRESS NOTES
"SUBJECTIVE:    Was a consent obtained?  Yes    Subjective: Fady Blood is a 33 year old  presents for IUD and desires paraguard type IUD insertion.  She requests removal of the old IUD because the IUD effectiveness has     Patient has been given the opportunity to ask questions about all forms of birth control, including all options appropriate for Fady Blood. Discussed that no method of birth control, except abstinence is 100% effective against pregnancy or sexually transmitted infection.     Fday Blood understands she may have the IUD removed at any time. IUD should be removed by a health care provider and the current IUD will be removed today.    The entire removal and insertion procedure was reviewed with the patient, including care after placement.    Today's PHQ-2 Score:       2024     8:31 AM   PHQ-2 (  [employer])   Q1: Little interest or pleasure in doing things 0   Q2: Feeling down, depressed or hopeless 0   PHQ-2 Score 0     Objective  /72 (BP Location: Left arm, Patient Position: Sitting, Cuff Size: Adult Regular)   Pulse 79   Temp 97.3  F (36.3  C) (Temporal)   Resp 16   Ht 1.445 m (4' 8.89\")   Wt 66.2 kg (146 lb)   LMP  (LMP Unknown)   SpO2 98%   BMI 31.72 kg/m      General Appearance:  Alert, cooperative, no distress, appears stated age   Head:  Normocephalic, without obvious abnormality, atraumatic   Eyes:  PERRL, conjunctiva/corneas clear, EOM's intact   Abdomen:   Soft, non-tender, bowel sounds active all four quadrants,  no masses, no organomegaly   Genitalia: Normally developed genitalia with no external lesions or eruptions.  Vagina and cervix show no lesions, inflammation, discharge or tenderness.  No cystocele.  Uterus normal size and position.  No adnexal mass or tenderness. IUD strings in place.          PROCEDURE:    IUD removal:   A speculum exam was performed and the cervix was visualized. The IUD string was visualized. Using ring forceps, the string  was " grasped and the IUD removed intact.     IUD insertion:  Under sterile technique, cervix was visualized with speculum and prepped with Betadine solution swab x 3.  The uterus was gently sounded to 7.0 cm. IUD prepared for placement, and IUD inserted according to 's instructions without difficulty or significant ressitance, and deployed at the fundus. The strings were visualized and trimmed to 3.0 cm from the external os. Speculum removed.  Patient tolerated procedure well.      EBL: minimal    Complications: none      POST PROCEDURE:    Given 's handouts, including when to have IUD removed, list of danger s/sx, side effects and follow up recommended. Encouraged condom use for prevention of STD. Advised to call for any fever, for prolonged or severe pain or bleeding, abnormal vaginal dischage, or unable to palpate strings. She was advised to use pain medications (ibuprofen) as needed for mild to moderate pain. Advised to follow-up in clinic in 4-6 weeks for IUD string check if unable to find strings or as directed by provider.     Assessment/plan  1. Encounter for IUD removal  2. Encounter for IUD insertion  - paragard intrauterine copper IUD device 1 each  No complications. See above.   Reviewed immunizations. These were updated today.   Encouraged to keep up with routine health maintenance.         Linda Fu MD

## 2024-03-15 ENCOUNTER — OFFICE VISIT (OUTPATIENT)
Dept: FAMILY MEDICINE | Facility: CLINIC | Age: 34
End: 2024-03-15
Attending: FAMILY MEDICINE
Payer: COMMERCIAL

## 2024-03-15 VITALS
BODY MASS INDEX: 33.07 KG/M2 | TEMPERATURE: 98.2 F | HEART RATE: 80 BPM | OXYGEN SATURATION: 98 % | RESPIRATION RATE: 20 BRPM | DIASTOLIC BLOOD PRESSURE: 63 MMHG | HEIGHT: 56 IN | WEIGHT: 147 LBS | SYSTOLIC BLOOD PRESSURE: 93 MMHG

## 2024-03-15 DIAGNOSIS — Z30.431 IUD CHECK UP: Primary | ICD-10-CM

## 2024-03-15 PROCEDURE — 99213 OFFICE O/P EST LOW 20 MIN: CPT | Performed by: FAMILY MEDICINE

## 2024-03-15 RX ORDER — COPPER 313.4 MG/1
1 INTRAUTERINE DEVICE INTRAUTERINE ONCE
COMMUNITY
Start: 2024-02-20 | End: 2024-03-15

## 2024-03-15 RX ORDER — COPPER 313.4 MG/1
1 INTRAUTERINE DEVICE INTRAUTERINE ONCE
COMMUNITY
Start: 2024-02-02

## 2024-03-15 NOTE — PROGRESS NOTES
"  Assessment & Plan     (Z30.431) IUD check up  (primary encounter diagnosis)  EHR reviewed.   Past medical history, problem list, past surgical history, family history, social history, medications reviewed, updated, reconciled.   IUD string in place.  No new concerns.   Encouraged to follow up for routine health maintenance or as needed.       BMI  Estimated body mass index is 33.12 kg/m  as calculated from the following:    Height as of this encounter: 1.419 m (4' 7.87\").    Weight as of this encounter: 66.7 kg (147 lb).   Weight management plan: Discussed healthy diet and exercise guidelines        Subjective   Ma is a 33 year old, presenting for the following health issues:  IUD check      3/15/2024     8:42 AM   Additional Questions   Roomed by Suzi TOMAS   Accompanied by Daughter     History of Present Illness       Reason for visit:  IUD follow up    She eats 2-3 servings of fruits and vegetables daily.She consumes 0 sweetened beverage(s) daily.She exercises with enough effort to increase her heart rate 9 or less minutes per day.  She exercises with enough effort to increase her heart rate 3 or less days per week.   She is taking medications regularly.       Here for IUD check up.   Was seen one month ago.   Paraguard IUD was placed.   She has no concerns. No abnormal bleeding. No pain. She has not been able to feel the strings herself.           Objective    BP 93/63 (BP Location: Right arm, Patient Position: Sitting, Cuff Size: Adult Large)   Pulse 80   Temp 98.2  F (36.8  C) (Temporal)   Resp 20   Ht 1.419 m (4' 7.87\")   Wt 66.7 kg (147 lb)   LMP  (LMP Unknown)   SpO2 98%   BMI 33.12 kg/m    Body mass index is 33.12 kg/m .  Physical Exam   GENERAL: alert and no distress  EYES: Eyes grossly normal to inspection, PERRL and conjunctivae and sclerae normal  ABDOMEN: soft, nontender, no hepatosplenomegaly, no masses and bowel sounds normal   (female) w/bimanual: normal female external genitalia, normal " urethral meatus, normal vaginal mucosa, normal cervix/adnexa/uterus without masses or discharge, IUD string in place  MS: no gross musculoskeletal defects noted, no edema            Signed Electronically by: Linda Fu MD      Prior to immunization administration, verified patients identity using patient s name and date of birth. Please see Immunization Activity for additional information.     Screening Questionnaire for Adult Immunization    Are you sick today?   No   Do you have allergies to medications, food, a vaccine component or latex?   No   Have you ever had a serious reaction after receiving a vaccination?   No   Do you have a long-term health problem with heart, lung, kidney, or metabolic disease (e.g., diabetes), asthma, a blood disorder, no spleen, complement component deficiency, a cochlear implant, or a spinal fluid leak?  Are you on long-term aspirin therapy?   No   Do you have cancer, leukemia, HIV/AIDS, or any other immune system problem?   No   Do you have a parent, brother, or sister with an immune system problem?   No   In the past 3 months, have you taken medications that affect  your immune system, such as prednisone, other steroids, or anticancer drugs; drugs for the treatment of rheumatoid arthritis, Crohn s disease, or psoriasis; or have you had radiation treatments?   No   Have you had a seizure, or a brain or other nervous system problem?   No   During the past year, have you received a transfusion of blood or blood    products, or been given immune (gamma) globulin or antiviral drug?   No   For women: Are you pregnant or is there a chance you could become       pregnant during the next month?   No   Have you received any vaccinations in the past 4 weeks?   No     Immunization questionnaire answers were all negative.      Patient instructed to remain in clinic for 15 minutes afterwards, and to report any adverse reactions.     Screening performed by Suzi Gonzalez MA on 3/15/2024 at 8:46  AM.

## 2024-03-22 ENCOUNTER — PATIENT OUTREACH (OUTPATIENT)
Dept: CARE COORDINATION | Facility: CLINIC | Age: 34
End: 2024-03-22
Payer: COMMERCIAL

## 2024-04-05 ENCOUNTER — PATIENT OUTREACH (OUTPATIENT)
Dept: CARE COORDINATION | Facility: CLINIC | Age: 34
End: 2024-04-05
Payer: COMMERCIAL

## 2024-12-07 ENCOUNTER — HOSPITAL ENCOUNTER (EMERGENCY)
Facility: HOSPITAL | Age: 34
Discharge: HOME | End: 2024-12-07
Payer: COMMERCIAL

## 2024-12-07 VITALS
OXYGEN SATURATION: 97 % | TEMPERATURE: 99.2 F | DIASTOLIC BLOOD PRESSURE: 81 MMHG | BODY MASS INDEX: 30.29 KG/M2 | SYSTOLIC BLOOD PRESSURE: 109 MMHG | WEIGHT: 144.3 LBS | RESPIRATION RATE: 16 BRPM | HEIGHT: 58 IN | HEART RATE: 83 BPM

## 2024-12-07 DIAGNOSIS — J10.1 INFLUENZA A: ICD-10-CM

## 2024-12-07 LAB
FLUAV RNA SPEC QL NAA+PROBE: POSITIVE
FLUBV RNA RESP QL NAA+PROBE: NEGATIVE
RSV RNA SPEC NAA+PROBE: NEGATIVE
SARS-COV-2 RNA RESP QL NAA+PROBE: NEGATIVE

## 2024-12-07 PROCEDURE — 99284 EMERGENCY DEPT VISIT MOD MDM: CPT

## 2024-12-07 PROCEDURE — 250N000013 HC RX MED GY IP 250 OP 250 PS 637

## 2024-12-07 PROCEDURE — 87637 SARSCOV2&INF A&B&RSV AMP PRB: CPT | Performed by: STUDENT IN AN ORGANIZED HEALTH CARE EDUCATION/TRAINING PROGRAM

## 2024-12-07 RX ORDER — OSELTAMIVIR PHOSPHATE 75 MG/1
75 CAPSULE ORAL 2 TIMES DAILY
Qty: 10 CAPSULE | Refills: 0 | Status: SHIPPED | OUTPATIENT
Start: 2024-12-07 | End: 2024-12-12

## 2024-12-07 RX ORDER — ACETAMINOPHEN 325 MG/1
975 TABLET ORAL ONCE
Status: COMPLETED | OUTPATIENT
Start: 2024-12-07 | End: 2024-12-07

## 2024-12-07 RX ORDER — IBUPROFEN 600 MG/1
600 TABLET, FILM COATED ORAL ONCE
Status: COMPLETED | OUTPATIENT
Start: 2024-12-07 | End: 2024-12-07

## 2024-12-07 RX ORDER — BENZONATATE 200 MG/1
200 CAPSULE ORAL 3 TIMES DAILY PRN
Qty: 30 CAPSULE | Refills: 0 | Status: SHIPPED | OUTPATIENT
Start: 2024-12-07

## 2024-12-07 RX ADMIN — ACETAMINOPHEN 975 MG: 325 TABLET ORAL at 14:39

## 2024-12-07 RX ADMIN — IBUPROFEN 600 MG: 600 TABLET, FILM COATED ORAL at 14:39

## 2024-12-07 ASSESSMENT — ACTIVITIES OF DAILY LIVING (ADL): ADLS_ACUITY_SCORE: 41

## 2024-12-07 NOTE — ED PROVIDER NOTES
EMERGENCY DEPARTMENT ENCOUNTER      NAME: Fady Blood  AGE: 34 year old female  YOB: 1990  MRN: [id number]  EVALUATION DATE & TIME: 12/7/2024  1:33 PM    PCP: Linda Fu    ED PROVIDER: Cynthia Dempsey PA-C    FINAL IMPRESSION:   Influenza A    CHIEF COMPLAINT:  Cough   Headache   Fevers     MEDICAL DECISION MAKING AND ED COURSE:  ED Course as of 12/07/24 1555   Sat Dec 07, 2024   1300 Patient is an otherwise healthy 34-year-old female with no pertinent medical history who presents today for headache, cough, fevers since yesterday.  No sick contacts.  No travel.  No shortness of breath, vomiting, abdominal pain, vision changes, neck stiffness.  Is still tolerating p.o. but has decreased appetite.    Vitals unremarkable.  Borderline febrile at 98.2 but no hypoxia, tachypnea, hypotension, tachycardia.  On exam, ill-appearing but in no distress.  Mucous membranes moist.  Posterior pharynx is pink with no erythema, tonsillar enlargement, or exudates.  Lungs good auscultation throughout without stridor, crackles, wheezing.  Handling her secretions.  No respiratory distress.     Will obtain viral swabs and give Tylenol and ibuprofen.  With her throat looking clear and no actual sore throat, I do not think strep is indicated at this time.  Her lungs are clear and her vitals are stable so I do not think x-rays indicated at this time.  Patient is agreeable.   1554 Influenza A positive.  I do think is the source of her symptoms.  She has no other medical history and is otherwise healthy but is within the 48-hour before window for starting Tamiflu.  I discussed the risks and benefits of this medication the patient would like to get started on it.  She was not taking Tylenol or Motrin at home for headaches so we will recommend that.  She has no meningeal signs here.  Her vitals are stable.  She is ill-appearing consistent with influenza but not toxic appearing.  Discussed strict return precautions including if  she has difficulty breathing, vomiting, or any other concerning symptoms.  She was agreeable and discharged in stable condition.       Medical Decision Making  Obtained supplemental history:Supplemental history obtained?: Documented in chart and Family Member/Significant Other  Reviewed external records: External records reviewed?: Documented in chart  Care impacted by chronic illness:Documented in Chart  Care significantly affected by social determinants of health:Access to Medical Care  Did you consider but not order tests?: Work up considered but not performed and documented in chart, if applicable  Did you interpret images independently?: Independent interpretation of ECG and images noted in documentation, when applicable.  Consultation discussion with other provider:Did you involve another provider (consultant, , pharmacy, etc.)?: No  Discharge. I prescribed additional prescription strength medication(s) as charted. See documentation for any additional details.        0 minutes of critical care time     MEDICATIONS GIVEN IN THE EMERGENCY:  Medications   acetaminophen (TYLENOL) tablet 975 mg (975 mg Oral $Given 12/7/24 1439)   ibuprofen (ADVIL/MOTRIN) tablet 600 mg (600 mg Oral $Given 12/7/24 1439)       NEW PRESCRIPTIONS STARTED AT TODAY'S ER VISIT  Discharge Medication List as of 12/7/2024  2:40 PM        START taking these medications    Details   benzonatate (TESSALON) 200 MG capsule Take 1 capsule (200 mg) by mouth 3 times daily as needed for cough., Disp-30 capsule, R-0, E-Prescribe      oseltamivir (TAMIFLU) 75 MG capsule Take 1 capsule (75 mg) by mouth 2 times daily for 5 days., Disp-10 capsule, R-0, E-Prescribe           Discharge Medication List as of 12/7/2024  2:40 PM          =================================================================    HPI    Patient information was obtained from: patient   Use of : Yes - Language Suad CRAVEN Jeremi is a 34 year old female with a pertinent  "history of IUD, anemia, mastalgia, spasmodic torticollis, and thrombocytopenia who presents to this ED via walk-in with family for evaluation of flu-like symptoms. Patient reports one day of headache, cough, no appetite, and a fever of 100.5 degrees. The headache has been progressive and is slowly getting worse. Patient denies difficulty breathing, vomiting, abdominal pain, neck pain, neck stiffness, sick contact, recent travel, and use of pain medication.    PHYSICAL EXAM    /81   Pulse 83   Temp 99.2  F (37.3  C) (Oral)   Resp 16   Ht 1.473 m (4' 10\")   Wt 65.5 kg (144 lb 4.8 oz)   LMP 12/02/2024   SpO2 97%   BMI 30.16 kg/m    Constitutional: ill appearing but not toxic appearing, NAD, GCS 15  HENT: Normocephalic, Atraumatic, Bilateral external ears normal, Oropharynx normal, mucous membranes moist, handling secretions.  No tonsillar enlargement.  Phonating appropriately.  Nose normal. Neck- Normal range of motion, No tenderness, Supple, No stridor.    Eyes: PERRL, EOMI, Conjunctiva normal, No discharge.   Respiratory: Normal breath sounds, No respiratory distress, No wheezing, Speaks full sentences easily. Dry cough noted  Cardiovascular: Normal heart rate, Regular rhythm, No murmurs, No rubs, No gallops.   GI: Nondistended.  Musculoskeletal: No edema. No cyanosis, No clubbing. Good range of motion in all major joints.   Integument: Warm, Dry, No erythema, No rash.    Neurologic: Alert & oriented x 3, Normal motor function, Normal sensory function, No focal deficits noted. Normal gait.    Psychiatric: Affect normal, Judgment normal, Mood normal. Cooperative.      LAB:  All pertinent labs reviewed and interpreted.  Results for orders placed or performed during the hospital encounter of 12/07/24   Influenza A/B, RSV and SARS-CoV2 PCR (COVID-19) Nasopharyngeal    Specimen: Nasopharyngeal; Swab   Result Value Ref Range    Influenza A PCR Positive (A) Negative    Influenza B PCR Negative Negative    RSV " PCR Negative Negative    SARS CoV2 PCR Negative Negative       RADIOLOGY:  Reviewed all pertinent imaging. Please see official radiology report.  No orders to display       I, Priscilla Jaocbo, am serving as a scribe to document services personally performed by Cynthia Dempsey PA-C, based on my observation and the provider's statements to me. I, Cynthia Dempsey PA-C, attest that Priscilla Jacoob is acting in a scribe capacity, has observed my performance of the services and has documented them in accordance with my direction.    Cynthia Dempsey PA-C  [facility] EMERGENCY DEPARTMENT  [address] [place] [id number]  [phone]      Cynthia Dempsey PA-C  12/07/24 5116

## 2024-12-07 NOTE — ED TRIAGE NOTES
Pt presents with cough, h/a, light headedness, decreased taste and fever to 100.5 for 4-5 days. Sent Covid from triage   Triage Assessment (Adult)       Row Name 12/07/24 1245          Triage Assessment    Airway WDL WDL        Respiratory WDL    Respiratory WDL cough     Cough Frequency frequent     Cough Type dry        Skin Circulation/Temperature WDL    Skin Circulation/Temperature WDL X  face slightly read per baseline per pt        Cardiac WDL    Cardiac Rhythm NSR        Peripheral/Neurovascular WDL    Peripheral Neurovascular WDL WDL        Cognitive/Neuro/Behavioral WDL    Cognitive/Neuro/Behavioral WDL WDL        Shabnam Coma Scale    Best Eye Response 4-->(E4) spontaneous     Best Motor Response 6-->(M6) obeys commands     Best Verbal Response 5-->(V5) oriented     Shabnam Coma Scale Score 15

## 2024-12-07 NOTE — DISCHARGE INSTRUCTIONS
You are seen in emergency department for cough, headache, body aches, fevers.  Your influenza A test here is positive.  This is the source of your symptoms.  For your body aches and headache, take Tylenol 1000 mg every 6 hours plus ibuprofen 600 mg every 6 hours.  To treat the influenza itself, because you have only had 1 day of symptoms, we can get you started on Tamiflu which is an antiviral medication.  You take this twice daily for 5 days.  Continue to drink lots of water.  For the cough, you can take benzonatate.  If you develop any new or worsening symptoms including vomiting, difficulty breathing, etc. return to the emergency department.